# Patient Record
Sex: FEMALE | Race: OTHER | Employment: UNEMPLOYED | URBAN - METROPOLITAN AREA
[De-identification: names, ages, dates, MRNs, and addresses within clinical notes are randomized per-mention and may not be internally consistent; named-entity substitution may affect disease eponyms.]

---

## 2020-01-21 ENCOUNTER — HOSPITAL ENCOUNTER (EMERGENCY)
Age: 20
Discharge: HOME HEALTH | End: 2020-01-21
Attending: EMERGENCY MEDICINE

## 2020-01-21 ENCOUNTER — APPOINTMENT (OUTPATIENT)
Dept: CT IMAGING | Age: 20
End: 2020-01-21

## 2020-01-21 VITALS
TEMPERATURE: 98 F | RESPIRATION RATE: 14 BRPM | OXYGEN SATURATION: 100 % | HEART RATE: 88 BPM | WEIGHT: 118 LBS | DIASTOLIC BLOOD PRESSURE: 74 MMHG | SYSTOLIC BLOOD PRESSURE: 112 MMHG

## 2020-01-21 LAB
A/G RATIO: 1.3 (ref 1.1–2.2)
ALBUMIN SERPL-MCNC: 4.4 G/DL (ref 3.4–5)
ALP BLD-CCNC: 86 U/L (ref 40–129)
ALT SERPL-CCNC: 12 U/L (ref 10–40)
ANION GAP SERPL CALCULATED.3IONS-SCNC: 10 MMOL/L (ref 3–16)
AST SERPL-CCNC: 21 U/L (ref 15–37)
BASOPHILS ABSOLUTE: 0 K/UL (ref 0–0.2)
BASOPHILS RELATIVE PERCENT: 0.1 %
BILIRUB SERPL-MCNC: 0.4 MG/DL (ref 0–1)
BILIRUBIN URINE: NEGATIVE
BLOOD, URINE: NEGATIVE
BUN BLDV-MCNC: 12 MG/DL (ref 7–20)
CALCIUM SERPL-MCNC: 9.2 MG/DL (ref 8.3–10.6)
CHLORIDE BLD-SCNC: 102 MMOL/L (ref 99–110)
CLARITY: CLEAR
CO2: 24 MMOL/L (ref 21–32)
COLOR: YELLOW
CREAT SERPL-MCNC: 0.6 MG/DL (ref 0.6–1.1)
EOSINOPHILS ABSOLUTE: 0 K/UL (ref 0–0.6)
EOSINOPHILS RELATIVE PERCENT: 0.7 %
GFR AFRICAN AMERICAN: >60
GFR NON-AFRICAN AMERICAN: >60
GLOBULIN: 3.4 G/DL
GLUCOSE BLD-MCNC: 101 MG/DL (ref 70–99)
GLUCOSE URINE: NEGATIVE MG/DL
HCG QUALITATIVE: NEGATIVE
HCT VFR BLD CALC: 39.7 % (ref 36–48)
HEMOGLOBIN: 13.2 G/DL (ref 12–16)
KETONES, URINE: NEGATIVE MG/DL
LEUKOCYTE ESTERASE, URINE: NEGATIVE
LIPASE: 19 U/L (ref 13–60)
LYMPHOCYTES ABSOLUTE: 0.3 K/UL (ref 1–5.1)
LYMPHOCYTES RELATIVE PERCENT: 5.1 %
MCH RBC QN AUTO: 29.8 PG (ref 26–34)
MCHC RBC AUTO-ENTMCNC: 33.3 G/DL (ref 31–36)
MCV RBC AUTO: 89.5 FL (ref 80–100)
MICROSCOPIC EXAMINATION: NORMAL
MONOCYTES ABSOLUTE: 0.5 K/UL (ref 0–1.3)
MONOCYTES RELATIVE PERCENT: 8.6 %
NEUTROPHILS ABSOLUTE: 5.5 K/UL (ref 1.7–7.7)
NEUTROPHILS RELATIVE PERCENT: 85.5 %
NITRITE, URINE: NEGATIVE
PDW BLD-RTO: 12.7 % (ref 12.4–15.4)
PH UA: 5 (ref 5–8)
PLATELET # BLD: 174 K/UL (ref 135–450)
PMV BLD AUTO: 9.3 FL (ref 5–10.5)
POTASSIUM SERPL-SCNC: 4 MMOL/L (ref 3.5–5.1)
PROTEIN UA: NEGATIVE MG/DL
RBC # BLD: 4.43 M/UL (ref 4–5.2)
SODIUM BLD-SCNC: 136 MMOL/L (ref 136–145)
SPECIFIC GRAVITY UA: >1.03 (ref 1–1.03)
TOTAL PROTEIN: 7.8 G/DL (ref 6.4–8.2)
URINE REFLEX TO CULTURE: NORMAL
URINE TYPE: NORMAL
UROBILINOGEN, URINE: 0.2 E.U./DL
WBC # BLD: 6.4 K/UL (ref 4–11)

## 2020-01-21 PROCEDURE — 6360000004 HC RX CONTRAST MEDICATION: Performed by: PHYSICIAN ASSISTANT

## 2020-01-21 PROCEDURE — 96374 THER/PROPH/DIAG INJ IV PUSH: CPT

## 2020-01-21 PROCEDURE — 74177 CT ABD & PELVIS W/CONTRAST: CPT

## 2020-01-21 PROCEDURE — 85025 COMPLETE CBC W/AUTO DIFF WBC: CPT

## 2020-01-21 PROCEDURE — 84703 CHORIONIC GONADOTROPIN ASSAY: CPT

## 2020-01-21 PROCEDURE — 6360000002 HC RX W HCPCS: Performed by: PHYSICIAN ASSISTANT

## 2020-01-21 PROCEDURE — 2580000003 HC RX 258: Performed by: PHYSICIAN ASSISTANT

## 2020-01-21 PROCEDURE — 81003 URINALYSIS AUTO W/O SCOPE: CPT

## 2020-01-21 PROCEDURE — 80053 COMPREHEN METABOLIC PANEL: CPT

## 2020-01-21 PROCEDURE — 96375 TX/PRO/DX INJ NEW DRUG ADDON: CPT

## 2020-01-21 PROCEDURE — 99284 EMERGENCY DEPT VISIT MOD MDM: CPT

## 2020-01-21 PROCEDURE — 83690 ASSAY OF LIPASE: CPT

## 2020-01-21 RX ORDER — NAPROXEN 500 MG/1
500 TABLET ORAL 2 TIMES DAILY
Qty: 20 TABLET | Refills: 0 | Status: SHIPPED | OUTPATIENT
Start: 2020-01-21 | End: 2020-01-31

## 2020-01-21 RX ORDER — DIPHENHYDRAMINE HYDROCHLORIDE 50 MG/ML
25 INJECTION INTRAMUSCULAR; INTRAVENOUS ONCE
Status: COMPLETED | OUTPATIENT
Start: 2020-01-21 | End: 2020-01-21

## 2020-01-21 RX ORDER — METOCLOPRAMIDE HYDROCHLORIDE 5 MG/ML
10 INJECTION INTRAMUSCULAR; INTRAVENOUS ONCE
Status: COMPLETED | OUTPATIENT
Start: 2020-01-21 | End: 2020-01-21

## 2020-01-21 RX ORDER — KETOROLAC TROMETHAMINE 30 MG/ML
15 INJECTION, SOLUTION INTRAMUSCULAR; INTRAVENOUS ONCE
Status: DISCONTINUED | OUTPATIENT
Start: 2020-01-21 | End: 2020-01-21 | Stop reason: HOSPADM

## 2020-01-21 RX ORDER — 0.9 % SODIUM CHLORIDE 0.9 %
1000 INTRAVENOUS SOLUTION INTRAVENOUS ONCE
Status: COMPLETED | OUTPATIENT
Start: 2020-01-21 | End: 2020-01-21

## 2020-01-21 RX ADMIN — DIPHENHYDRAMINE HYDROCHLORIDE 25 MG: 50 INJECTION, SOLUTION INTRAMUSCULAR; INTRAVENOUS at 15:45

## 2020-01-21 RX ADMIN — IOPAMIDOL 75 ML: 755 INJECTION, SOLUTION INTRAVENOUS at 16:52

## 2020-01-21 RX ADMIN — METOCLOPRAMIDE 10 MG: 5 INJECTION, SOLUTION INTRAMUSCULAR; INTRAVENOUS at 15:44

## 2020-01-21 RX ADMIN — SODIUM CHLORIDE 1000 ML: 9 INJECTION, SOLUTION INTRAVENOUS at 15:45

## 2020-01-21 ASSESSMENT — PAIN DESCRIPTION - PAIN TYPE: TYPE: ACUTE PAIN

## 2020-01-21 ASSESSMENT — ENCOUNTER SYMPTOMS
NAUSEA: 1
ABDOMINAL PAIN: 1
STRIDOR: 0
VOMITING: 0
COLOR CHANGE: 0
ANAL BLEEDING: 0
DIARRHEA: 0
BACK PAIN: 0
CONSTIPATION: 0
WHEEZING: 0
ABDOMINAL DISTENTION: 0
COUGH: 0
BLOOD IN STOOL: 0
RECTAL PAIN: 0
SHORTNESS OF BREATH: 0

## 2020-01-21 ASSESSMENT — PAIN DESCRIPTION - LOCATION: LOCATION: ABDOMEN

## 2020-01-21 ASSESSMENT — PAIN SCALES - GENERAL: PAINLEVEL_OUTOF10: 8

## 2020-01-21 NOTE — ED NOTES
PIV placed without difficulties. Labs obtained, labeled at bedside, and sent directly to lab.    Pt. Medicated per [facility]     Colby Mclean RN  01/21/20 5578

## 2020-01-21 NOTE — ED PROVIDER NOTES
[place] [employer] [address] ENCOUNTER      Pt Name: Richelle Curry  PZD:[id number]  [place] 2000  Date of evaluation: 2020  Provider: Stephie Phillip PA-C  Attending provider Dr Hilton Anderson did personally evaluate this patient. Chief Complaint:    Chief Complaint   Patient presents with    Abdominal Pain     low abd pain and fever since yesterday. denies n/v/d       Nursing Notes, Past Medical Hx, Past Surgical Hx, Social Hx, Allergies, and Family Hx were all reviewed and agreed with or any disagreements were addressed in the HPI.    HPI:  (Location, Duration, Timing, Severity, Quality, Assoc Sx, Context, Modifying factors)  This is a  23 y.o. female who presents to the emergency department complaining of low abdominal pain  with low-grade fever. She says that she has had this pain intermittently for several months. She reports mild nausea but no vomiting,, diarrhea, constipation, bloody or black tarry stool, abnormal vaginal bleeding or discharge or acute urinary symptoms. Despite rating her pain to be an 8 out of 10 on pain scale at times, patient is lying comfortably in bed and does not appear to be writhing in pain, pale, diaphoretic or in acute distress. she denies any pain currently. She says that she has had this type of pain in the past when she is about to come on her menstrual cycle. However, says that she has not a menstrual cycle in two months. She says that her periods are irregular however. She denies any urinary symptoms or problems voiding. PastMedical/Surgical History:  History reviewed. No pertinent past medical history. Procedure Laterality Date     SECTION         Medications:  Previous Medications    No medications on file         Review of Systems:  Review of Systems   Constitutional: Positive for fever. Negative for chills. HENT: Negative.     Eyes: Negative for visual disturbance. Respiratory: Negative for cough, shortness of breath, wheezing and stridor. Cardiovascular: Negative for chest pain, palpitations and leg swelling. Gastrointestinal: Positive for abdominal pain and nausea. Negative for abdominal distention, anal bleeding, blood in stool, constipation, diarrhea, rectal pain and vomiting. Endocrine: Negative. Genitourinary: Positive for menstrual problem. Negative for decreased urine volume, difficulty urinating, dysuria, flank pain, frequency, hematuria, pelvic pain, urgency, vaginal bleeding, vaginal discharge and vaginal pain. Musculoskeletal: Negative for back pain, neck pain and neck stiffness. Skin: Negative for color change, pallor, rash and wound. Neurological: Negative for dizziness, tremors, seizures, syncope, facial asymmetry, speech difficulty, weakness, light-headedness, numbness and headaches. Psychiatric/Behavioral: Negative for confusion. All other systems reviewed and are negative. Positives and Pertinent negatives as per HPI. Except as noted above in the ROS, problem specific ROS was completed and is negative. Physical Exam:  Physical Exam  Vitals signs and nursing note reviewed. Constitutional:       Appearance: Normal appearance. She is well-developed. She is not toxic-appearing or diaphoretic. HENT:      Head: Normocephalic and atraumatic. Right Ear: External ear normal.      Left Ear: External ear normal.      Nose: Nose normal.      Mouth/Throat:      Mouth: Mucous membranes are moist.      Pharynx: Oropharynx is clear. Eyes:      General: No scleral icterus. Right eye: No discharge. Left eye: No discharge. Extraocular Movements: Extraocular movements intact. Conjunctiva/sclera: Conjunctivae normal.      Pupils: Pupils are equal, round, and reactive to light. Neck:      Musculoskeletal: Normal range of motion. Cardiovascular:      Rate and Rhythm: Normal rate.    Pulmonary: distention of the urinary bladder.  Reproductive organs  within normal limits.  Trace pelvic fluid, within physiologic limits    Peritoneum/Retroperitoneum: Aorta normal in caliber.  No ascites or  pneumoperitoneum    Bones/Soft Tissues: No bony abnormality            MEDICAL DECISION MAKING / ED COURSE:      PROCEDURES:   Procedures    None    Patient was given:  Medications   ketorolac (TORADOL) injection 15 mg (0 mg Intravenous Held 1/21/20 1733)   metoclopramide (REGLAN) injection 10 mg (10 mg Intravenous Given 1/21/20 1544)   diphenhydrAMINE (BENADRYL) injection 25 mg (25 mg Intravenous Given 1/21/20 1545)   0.9 % sodium chloride bolus (0 mLs Intravenous Stopped 1/21/20 1734)   iopamidol (ISOVUE-370) 76 % injection 75 mL (75 mLs Intravenous Given 1/21/20 1652)       This patient presents complaining of low abdominal discomfort for several days. She states that she has not had a menstrual cycle for 2 months. However, admits that her menstrual cycles are irregular. She denies any active vaginal bleeding or discharge at this time. Denies any trouble voiding or issues with urination. Abdomen is soft and nontender in all 4 quadrants about pulsatile mass or CVA tenderness. She does want to be sure she is not pregnant and pregnancy test is negative at this time. CT scan of the abdomen and pelvis show distended bladder, therefore we did inquire further about difficulty voiding and patient denies this. Urinalysis does not show any evidence of infection. Renal function is preserved. Patient declined Toradol, as she is not having any pain actively. She is advised to follow-up with PCP and gynecologist for recheck and may return to ED per discharge instructions. She declines a pelvic exam at this time as she is not concerned about infection. The patient tolerated their visit well. I evaluated the patient. The physician was available for consultation as needed.   The patient and / or the family were informed

## 2020-01-21 NOTE — ED PROVIDER NOTES
I independently performed a history and physical on [address]. All diagnostic, treatment, and disposition decisions were made by myself in conjunction with the advanced practice provider. Briefly, this is a 23 y.o. female here for a few weeks of lower abdominal pain, intermittent, with urinary urgency. NO n/v/d. LMP ~2mo ago, states it's frequently irregular. On exam, TTP To suprapubic. Screenings            MDM  WDWN 19yoF with benign abdominal exam  Labs and imaging reviewed; needs UA. Patient Referrals:  [facility]) Pre-Services  [address]  [address] [facility] [address] [place] [id number] [phone]    for gynecology follow up    [facility] Emergency Department  [address] [facility]  [phone]    If symptoms worsen      Discharge Medications:  Discharge Medication List as of 1/21/2020  5:57 PM      START taking these medications    Details   naproxen (NAPROSYN) 500 MG tablet Take 1 tablet by mouth 2 times daily for 20 doses, Disp-20 tablet, R-0Print             FINAL IMPRESSION  1. Acute abdominal pain    2. Irregular menses        Blood pressure 112/74, pulse 88, temperature 98 °F (36.7 °C), temperature source Infrared, resp. rate 14, weight 118 lb (53.5 kg), last menstrual period 12/24/2019, SpO2 100 %. For further details of Adolfo Pickard's emergency department encounter, please see documentation by advanced practice provider, Janae Gonzales.         Maria Alejandra Thomas MD  01/21/20 [facility]

## 2020-01-21 NOTE — ED NOTES
Pt. ambulated to restroom to obtain urine sample. Gait steady. Specimen collected, labeled at bedside, and sent directly to lab.         Tyrel Lofton RN  01/21/20 9186

## 2020-01-21 NOTE — ED NOTES
Saundra Lowe, [address] at bedside updating pt.  On plan of care     Edu Srivastava RN  01/21/20 8238

## 2020-01-21 NOTE — ED NOTES
Pt. Reporting decreased pain.  Denies need for additional pain medication at this time     Henri Murillo RN  01/21/20 3877

## 2020-01-21 NOTE — ED NOTES
Pt. discharged in stable position. Pt. provided with discharge instructions and prescriptions. Given opportunity to ask questions if needed and verbalized understanding. Pt. Ambulated to exit per self.         Praneeth Ramirez RN  01/21/20 2295

## 2021-07-09 ENCOUNTER — APPOINTMENT (OUTPATIENT)
Dept: ULTRASOUND IMAGING | Age: 21
End: 2021-07-09

## 2021-07-09 ENCOUNTER — HOSPITAL ENCOUNTER (EMERGENCY)
Age: 21
Discharge: HOME | End: 2021-07-09
Attending: EMERGENCY MEDICINE

## 2021-07-09 VITALS
WEIGHT: 135 LBS | HEART RATE: 75 BPM | OXYGEN SATURATION: 98 % | TEMPERATURE: 98.1 F | RESPIRATION RATE: 12 BRPM | SYSTOLIC BLOOD PRESSURE: 104 MMHG | DIASTOLIC BLOOD PRESSURE: 62 MMHG

## 2021-07-09 DIAGNOSIS — O26.891 ABDOMINAL PAIN IN PREGNANCY, FIRST TRIMESTER: Primary | ICD-10-CM

## 2021-07-09 DIAGNOSIS — R10.9 ABDOMINAL PAIN IN PREGNANCY, FIRST TRIMESTER: Primary | ICD-10-CM

## 2021-07-09 DIAGNOSIS — N30.00 ACUTE CYSTITIS WITHOUT HEMATURIA: ICD-10-CM

## 2021-07-09 LAB
A/G RATIO: 1.5 (ref 1.1–2.2)
ABO/RH: NORMAL
ALBUMIN SERPL-MCNC: 4.1 G/DL (ref 3.4–5)
ALP BLD-CCNC: 72 U/L (ref 40–129)
ALT SERPL-CCNC: 19 U/L (ref 10–40)
ANION GAP SERPL CALCULATED.3IONS-SCNC: 8 MMOL/L (ref 3–16)
ANTIBODY SCREEN: NORMAL
AST SERPL-CCNC: 19 U/L (ref 15–37)
BACTERIA WET PREP: NORMAL
BACTERIA: ABNORMAL /HPF
BASOPHILS ABSOLUTE: 0 K/UL (ref 0–0.2)
BASOPHILS RELATIVE PERCENT: 0.2 %
BILIRUB SERPL-MCNC: 0.4 MG/DL (ref 0–1)
BILIRUBIN URINE: NEGATIVE
BLOOD, URINE: NEGATIVE
BUN BLDV-MCNC: 8 MG/DL (ref 7–20)
CALCIUM SERPL-MCNC: 9.1 MG/DL (ref 8.3–10.6)
CHLORIDE BLD-SCNC: 103 MMOL/L (ref 99–110)
CLARITY: ABNORMAL
CLUE CELLS: NORMAL
CO2: 24 MMOL/L (ref 21–32)
COLOR: YELLOW
CREAT SERPL-MCNC: 0.6 MG/DL (ref 0.6–1.1)
EOSINOPHILS ABSOLUTE: 0.1 K/UL (ref 0–0.6)
EOSINOPHILS RELATIVE PERCENT: 1.4 %
EPITHELIAL CELLS WET PREP: NORMAL
EPITHELIAL CELLS, UA: 4 /HPF (ref 0–5)
GFR AFRICAN AMERICAN: >60
GFR NON-AFRICAN AMERICAN: >60
GLOBULIN: 2.8 G/DL
GLUCOSE BLD-MCNC: 92 MG/DL (ref 70–99)
GLUCOSE URINE: NEGATIVE MG/DL
GONADOTROPIN, CHORIONIC (HCG) QUANT: 6615 MIU/ML
HCG(URINE) PREGNANCY TEST: POSITIVE
HCT VFR BLD CALC: 38.9 % (ref 36–48)
HEMOGLOBIN: 13.3 G/DL (ref 12–16)
HYALINE CASTS: 11 /LPF (ref 0–8)
KETONES, URINE: NEGATIVE MG/DL
LEUKOCYTE ESTERASE, URINE: ABNORMAL
LYMPHOCYTES ABSOLUTE: 1.7 K/UL (ref 1–5.1)
LYMPHOCYTES RELATIVE PERCENT: 22.6 %
MCH RBC QN AUTO: 30.1 PG (ref 26–34)
MCHC RBC AUTO-ENTMCNC: 34.2 G/DL (ref 31–36)
MCV RBC AUTO: 88 FL (ref 80–100)
MICROSCOPIC EXAMINATION: YES
MONOCYTES ABSOLUTE: 0.5 K/UL (ref 0–1.3)
MONOCYTES RELATIVE PERCENT: 6.5 %
NEUTROPHILS ABSOLUTE: 5.1 K/UL (ref 1.7–7.7)
NEUTROPHILS RELATIVE PERCENT: 69.3 %
NITRITE, URINE: NEGATIVE
PDW BLD-RTO: 12.9 % (ref 12.4–15.4)
PH UA: 6 (ref 5–8)
PLATELET # BLD: 215 K/UL (ref 135–450)
PMV BLD AUTO: 8.8 FL (ref 5–10.5)
POTASSIUM REFLEX MAGNESIUM: 3.7 MMOL/L (ref 3.5–5.1)
PROTEIN UA: NEGATIVE MG/DL
RBC # BLD: 4.42 M/UL (ref 4–5.2)
RBC UA: 1 /HPF (ref 0–4)
RBC WET PREP: NORMAL
SODIUM BLD-SCNC: 135 MMOL/L (ref 136–145)
SOURCE WET PREP: NORMAL
SPECIFIC GRAVITY UA: 1.03 (ref 1–1.03)
TOTAL PROTEIN: 6.9 G/DL (ref 6.4–8.2)
TRICHOMONAS PREP: NORMAL
URINE REFLEX TO CULTURE: YES
URINE TYPE: ABNORMAL
UROBILINOGEN, URINE: 0.2 E.U./DL
WBC # BLD: 7.4 K/UL (ref 4–11)
WBC UA: 40 /HPF (ref 0–5)
WBC WET PREP: NORMAL
YEAST WET PREP: NORMAL

## 2021-07-09 PROCEDURE — 87210 SMEAR WET MOUNT SALINE/INK: CPT

## 2021-07-09 PROCEDURE — 86900 BLOOD TYPING SEROLOGIC ABO: CPT

## 2021-07-09 PROCEDURE — 87086 URINE CULTURE/COLONY COUNT: CPT

## 2021-07-09 PROCEDURE — 86901 BLOOD TYPING SEROLOGIC RH(D): CPT

## 2021-07-09 PROCEDURE — 36415 COLL VENOUS BLD VENIPUNCTURE: CPT

## 2021-07-09 PROCEDURE — 81001 URINALYSIS AUTO W/SCOPE: CPT

## 2021-07-09 PROCEDURE — 76817 TRANSVAGINAL US OBSTETRIC: CPT

## 2021-07-09 PROCEDURE — 80053 COMPREHEN METABOLIC PANEL: CPT

## 2021-07-09 PROCEDURE — 85025 COMPLETE CBC W/AUTO DIFF WBC: CPT

## 2021-07-09 PROCEDURE — 87491 CHLMYD TRACH DNA AMP PROBE: CPT

## 2021-07-09 PROCEDURE — 6370000000 HC RX 637 (ALT 250 FOR IP): Performed by: EMERGENCY MEDICINE

## 2021-07-09 PROCEDURE — 86850 RBC ANTIBODY SCREEN: CPT

## 2021-07-09 PROCEDURE — 99285 EMERGENCY DEPT VISIT HI MDM: CPT

## 2021-07-09 PROCEDURE — 84703 CHORIONIC GONADOTROPIN ASSAY: CPT

## 2021-07-09 PROCEDURE — 2580000003 HC RX 258: Performed by: EMERGENCY MEDICINE

## 2021-07-09 PROCEDURE — 87591 N.GONORRHOEAE DNA AMP PROB: CPT

## 2021-07-09 PROCEDURE — 84702 CHORIONIC GONADOTROPIN TEST: CPT

## 2021-07-09 RX ORDER — CEPHALEXIN 500 MG/1
500 CAPSULE ORAL 2 TIMES DAILY
Qty: 10 CAPSULE | Refills: 0 | Status: SHIPPED | OUTPATIENT
Start: 2021-07-09 | End: 2021-07-14

## 2021-07-09 RX ORDER — ACETAMINOPHEN 325 MG/1
650 TABLET ORAL ONCE
Status: COMPLETED | OUTPATIENT
Start: 2021-07-09 | End: 2021-07-09

## 2021-07-09 RX ORDER — 0.9 % SODIUM CHLORIDE 0.9 %
1000 INTRAVENOUS SOLUTION INTRAVENOUS ONCE
Status: COMPLETED | OUTPATIENT
Start: 2021-07-09 | End: 2021-07-09

## 2021-07-09 RX ADMIN — SODIUM CHLORIDE 1000 ML: 9 INJECTION, SOLUTION INTRAVENOUS at 08:00

## 2021-07-09 RX ADMIN — ACETAMINOPHEN 650 MG: 325 TABLET ORAL at 07:59

## 2021-07-09 ASSESSMENT — PAIN SCALES - GENERAL
PAINLEVEL_OUTOF10: 8
PAINLEVEL_OUTOF10: 8

## 2021-07-09 NOTE — ED NOTES
Dr. Andreas Perdomo bedside to evaluate pt.  / Dr. Hawk Paniagua updated on pt     Tammy Lehman RN  07/09/21 9547

## 2021-07-09 NOTE — ED PROVIDER NOTES
EMERGENCY DEPARTMENT PROVIDER NOTE    Patient Identification  Pt Name: Shelly Mackay  MRN: [id number]  [place] 2000  Date of evaluation: 2021  Provider: Hattie Rucker DO  PCP: No primary care provider on file. Chief Complaint  Abdominal Pain (c/o abd pain x4 days denies any n/v//d rates pain 8/10 )      HPI  (History provided by patient)  This is a 21 y.o. female otherwise healthy who was brought in by self for lower abdominal pain ongoing for the past 3 days. Pain is aching and crampy, worsened with movement, nothing seems to make it better. Associated with burning with urination and scant vaginal discharge. Denies any history of similar symptoms. Pain 8 out of 10 in severity. Patient is sexually active, unsure of her last menstrual period. She denies any fevers, chills, nausea, vomiting or diarrhea. Of note patient is primarily [place]-speaking,  #[id number] utilized    ROS    Const:  No fevers, no chills, no generalized weakness  Skin:  No rash, no lesions  Eyes:  No visual changes, no blurry or double vision, no pain  ENT:  No sore throat, no difficulty swallowing, no ear pain, no sinus pain or congestion  Card:  No chest pain, no palpitations, no edema  Resp:  No shortness of breath, no cough, no wheezing  Abd:  +abdominal pain, no nausea, no vomiting, no diarrhea  Genitourinary:  +dysuria, no hematuria, +vaginal discharge, no vaginal bleeding  MSK:  No joint pain, no myalgia  Neuro:  No focal weakness, no headache, no paresthesia    All other systems reviewed and negative unless otherwise noted in HPI      I have reviewed the following nursing documentation:  Allergies: Patient has no known allergies. Past medical history: History reviewed. No pertinent past medical history.   Past surgical history:   Past Surgical History:   Procedure Laterality Date     SECTION         Home medications:   Discharge Medication List as of 2021 12:45 PM      CONTINUE these medications which have NOT CHANGED    Details   naproxen (NAPROSYN) 500 MG tablet Take 1 tablet by mouth 2 times daily for 20 doses, Disp-20 tablet, R-0Print             Social history:  reports that she has never smoked. She has never used smokeless tobacco. She reports that she does not drink alcohol and does not use drugs. Family history:  History reviewed. No pertinent family history. Exam  ED Triage Vitals [07/09/21 0622]   BP Temp Temp Source Pulse Resp SpO2 Height Weight   101/61 98.1 °F (36.7 °C) Oral 70 16 99 % -- 135 lb (61.2 kg)     Nursing note and vitals reviewed. Constitutional: Well developed, well nourished. Non-toxic in appearance. HENT:      Head: Normocephalic and atraumatic. Ears: External ears normal.      Nose: Nose normal.     Mouth: Membrane mucosa moist and pink. Eyes: Anicteric sclera. No discharge. Neck: Supple. Trachea midline. Cardiovascular: RRR; no murmurs, rubs, or gallops. Pulmonary/Chest: Effort normal. No respiratory distress. CTAB. No stridor. No wheezes. No rales. Abdominal: Soft. No distension. Tender to palpation in suprapubic region and left lower quadrant. No focal right lower quadrant tenderness, negative Rovsing. No rebound, no rigidity, no guarding. : Pelvic exam demonstrates closed os with scant white discharge, no bleeding, retained foreign body or other lesions. Bimanual exam without palpable masses, no cervical motion tenderness. Nicolle Stacy RN as chaperone  Musculoskeletal: Moves all extremities. No gross deformity. Neurological: Alert and oriented. Face symmetric. Speech is clear. Skin: Warm and dry. No rash. Psychiatric: Normal mood and affect. Behavior is normal.          Radiology  US OB TRANSVAGINAL   Preliminary Result   1. Anechoic structure within the uterine fundus could represent early   gestational sac in which fetal pole/yolk sac is not yet visualized.   Pseudo   gestational sac associated with ectopic pregnancy cannot American >60 >60    GFR African American >60 >60    Calcium 9.1 8.3 - 10.6 mg/dL    Total Protein 6.9 6.4 - 8.2 g/dL    Albumin 4.1 3.4 - 5.0 g/dL    Albumin/Globulin Ratio 1.5 1.1 - 2.2    Total Bilirubin 0.4 0.0 - 1.0 mg/dL    Alkaline Phosphatase 72 40 - 129 U/L    ALT 19 10 - 40 U/L    AST 19 15 - 37 U/L    Globulin 2.8 g/dL   Urinalysis Reflex to Culture    Specimen: Urine, clean catch   Result Value Ref Range    Color, UA YELLOW Straw/Yellow    Clarity, UA CLOUDY (A) Clear    Glucose, Ur Negative Negative mg/dL    Bilirubin Urine Negative Negative    Ketones, Urine Negative Negative mg/dL    Specific Gravity, UA 1.028 1.005 - 1.030    Blood, Urine Negative Negative    pH, UA 6.0 5.0 - 8.0    Protein, UA Negative Negative mg/dL    Urobilinogen, Urine 0.2 <2.0 E.U./dL    Nitrite, Urine Negative Negative    Leukocyte Esterase, Urine MODERATE (A) Negative    Microscopic Examination YES     Urine Type NotGiven     Urine Reflex to Culture Yes    Microscopic Urinalysis   Result Value Ref Range    Bacteria, UA 1+ (A) None Seen /HPF    Hyaline Casts, UA 11 (H) 0 - 8 /LPF    WBC, UA 40 (H) 0 - 5 /HPF    RBC, UA 1 0 - 4 /HPF    Epithelial Cells, UA 4 0 - 5 /HPF   Pregnancy, Urine   Result Value Ref Range    HCG(Urine) Pregnancy Test POSITIVE Detects HCG level >20 MIU/mL   HCG, Quantitative, Pregnancy   Result Value Ref Range    hCG Quant 6615.0 <5.0 mIU/mL   TYPE AND SCREEN   Result Value Ref Range    ABO/Rh O POS     Antibody Screen NEG        Screenings   [place] Coma Scale  Eye Opening: Spontaneous  Best Verbal Response: Oriented  Best Motor Response: Obeys commands  [place] Coma Scale Score: 15       MDM and ED Course    Patient afebrile and nontoxic. No distress. No peritoneal signs on abdominal exam, I have very low suspicion for perforation or obstruction. No findings to suggest acute appendicitis or cholecystitis. UA with evidence of infection, a pelvic exam was performed and not consistent with PID.   No findings to suggest pyelonephritis. Pregnancy test is positive, beta hCG 6615, this is a new diagnosis of pregnancy for this patient. Did just proceed with transvaginal ultrasound which revealed anechoic structure in the uterus, however no clear intrauterine pregnancy. I discussed case with Dr. Jesús Hastings for OB/GYN who performed a face-to-face evaluation of patient in the emergency department as well. Given her benign laboratory evaluation and abdomen there is very low suspicion for ruptured ectopic at this time. Dr. Jesús Hastings states ultrasound findings may be secondary to early intrauterine pregnancy, no indication for methotrexate or other abortifacient at this time. Recommends close outpatient follow-up and will be able to see patient in clinic after the weekend. A repeat ultrasound and beta hCG test have been ordered. Will discharge patient to self-care with very close PCP and OB/GYN follow-up and the appropriate contact information was provided. Will prescribe cephalexin for UTI. Very strict and immediate return precautions to the emergency department including worsened or changing abdominal pain were discussed. Final Impression  1. Abdominal pain in pregnancy, first trimester    2. Acute cystitis without hematuria        Blood pressure 104/62, pulse 75, temperature 98.1 °F (36.7 °C), temperature source Oral, resp. rate 12, weight 135 lb (61.2 kg), last menstrual period 05/24/2021, SpO2 98 %.      Disposition:  DISPOSITION Decision To Discharge 07/09/2021 11:58:08 AM      Patient Referrals:  [address]  [address] [place] [id number] [phone]  On 7/13/2021      [facility]) Pre-Services  [phone]          Discharge Medications:  Discharge Medication List as of 7/9/2021 12:45 PM      START taking these medications    Details   cephALEXin (KEFLEX) 500 MG capsule Take 1 capsule by mouth 2 times daily for 5 days, Disp-10 capsule, R-0Print Discontinued Medications:  Discharge Medication List as of 7/9/2021 12:45 PM          This chart was generated using the [employer] [facility] dictation system. I created this record but it may contain dictation errors given the limitations of this technology.     Jackson Powell DO (electronically signed)  Attending Emergency Physician       Jackson Powell DO  07/09/21 5890

## 2021-07-09 NOTE — ED NOTES
RN to pt room; pt a/o x 3, lying supine in bed with bed in low/locked position.  Pt medicated as ordered, vitals obtained, pt updated on plan of care, v/u.      Stephanie Baig RN  07/09/21 3508

## 2021-07-10 LAB — URINE CULTURE, ROUTINE: NORMAL

## 2021-07-12 LAB
C TRACH DNA GENITAL QL NAA+PROBE: POSITIVE
N. GONORRHOEAE DNA: NEGATIVE

## 2021-07-14 NOTE — ED NOTES
[facility]   Emergency Department Culture Follow-Up       Nette Moreno (CSN: [id number]) was seen and evaluated at [facility] Emergency Department on 7/9/21 by provider  Dr Leslie Zurita    + chlamydia      Treatment Course:    Pt was not treated      Recommendation:    Zithromax 500 mg tab 2 PO x 1 dpse    This recommendation was reviewed with and agreed by ED provider DAMON Bell    Follow-Up:    Pt contacted through [address] [place] [address] Camron Carmelo interpretor [id number], notified of + results, Rx called in to [facility]  ([phone]).     Thank you,    Mo Ventura RN  7/14/2021     Flavia Hwang RN  07/14/21 6030